# Patient Record
Sex: MALE | URBAN - METROPOLITAN AREA
[De-identification: names, ages, dates, MRNs, and addresses within clinical notes are randomized per-mention and may not be internally consistent; named-entity substitution may affect disease eponyms.]

---

## 2018-04-17 ENCOUNTER — TELEPHONE (OUTPATIENT)
Dept: OTHER | Facility: CLINIC | Age: 43
End: 2018-04-17

## 2018-04-17 NOTE — TELEPHONE ENCOUNTER
4/17/2018    Call Regarding Onboarding [employer] other    Attempt 1    Message voicemail full    Comments:       Outreach   Latia Laws

## 2018-05-11 NOTE — TELEPHONE ENCOUNTER
5/11/2018    Call Regarding Onboarding [employer] [place] [facility] OTHER    Attempt 2    Message voicemail full    Comments:       Outreach   TRISTAN

## 2018-06-06 NOTE — TELEPHONE ENCOUNTER
6/6/2018    Call Regarding Onboarding [employer] [place] [facility] OTHER    Attempt 3    Message no voicemail     Comments:       Outreach   TRISTAN